# Patient Record
Sex: MALE | Employment: FULL TIME | URBAN - NONMETROPOLITAN AREA
[De-identification: names, ages, dates, MRNs, and addresses within clinical notes are randomized per-mention and may not be internally consistent; named-entity substitution may affect disease eponyms.]

---

## 2024-08-01 ENCOUNTER — OFFICE VISIT (OUTPATIENT)
Dept: PRIMARY CARE CLINIC | Age: 64
End: 2024-08-01

## 2024-08-01 VITALS
BODY MASS INDEX: 31.82 KG/M2 | SYSTOLIC BLOOD PRESSURE: 152 MMHG | WEIGHT: 198 LBS | OXYGEN SATURATION: 97 % | HEIGHT: 66 IN | DIASTOLIC BLOOD PRESSURE: 90 MMHG | HEART RATE: 97 BPM

## 2024-08-01 DIAGNOSIS — E66.9 OBESITY (BMI 30-39.9): ICD-10-CM

## 2024-08-01 DIAGNOSIS — Z00.00 WELL ADULT EXAM: Primary | ICD-10-CM

## 2024-08-01 DIAGNOSIS — R06.83 SNORING: ICD-10-CM

## 2024-08-01 DIAGNOSIS — Z12.11 COLON CANCER SCREENING: ICD-10-CM

## 2024-08-01 DIAGNOSIS — R03.0 ELEVATED BP WITHOUT DIAGNOSIS OF HYPERTENSION: ICD-10-CM

## 2024-08-01 DIAGNOSIS — Z12.5 SCREENING PSA (PROSTATE SPECIFIC ANTIGEN): ICD-10-CM

## 2024-08-01 DIAGNOSIS — M79.672 LEFT FOOT PAIN: ICD-10-CM

## 2024-08-01 DIAGNOSIS — G47.30 SLEEP APNEA, UNSPECIFIED TYPE: ICD-10-CM

## 2024-08-01 RX ORDER — CALCIUM CARBONATE 500(1250)
500 TABLET ORAL DAILY
COMMUNITY

## 2024-08-01 SDOH — ECONOMIC STABILITY: HOUSING INSECURITY
IN THE LAST 12 MONTHS, WAS THERE A TIME WHEN YOU DID NOT HAVE A STEADY PLACE TO SLEEP OR SLEPT IN A SHELTER (INCLUDING NOW)?: NO

## 2024-08-01 SDOH — ECONOMIC STABILITY: FOOD INSECURITY: WITHIN THE PAST 12 MONTHS, THE FOOD YOU BOUGHT JUST DIDN'T LAST AND YOU DIDN'T HAVE MONEY TO GET MORE.: NEVER TRUE

## 2024-08-01 SDOH — ECONOMIC STABILITY: FOOD INSECURITY: WITHIN THE PAST 12 MONTHS, YOU WORRIED THAT YOUR FOOD WOULD RUN OUT BEFORE YOU GOT MONEY TO BUY MORE.: NEVER TRUE

## 2024-08-01 SDOH — ECONOMIC STABILITY: INCOME INSECURITY: HOW HARD IS IT FOR YOU TO PAY FOR THE VERY BASICS LIKE FOOD, HOUSING, MEDICAL CARE, AND HEATING?: NOT HARD AT ALL

## 2024-08-01 ASSESSMENT — PATIENT HEALTH QUESTIONNAIRE - PHQ9
1. LITTLE INTEREST OR PLEASURE IN DOING THINGS: NOT AT ALL
SUM OF ALL RESPONSES TO PHQ QUESTIONS 1-9: 0
2. FEELING DOWN, DEPRESSED OR HOPELESS: NOT AT ALL
SUM OF ALL RESPONSES TO PHQ9 QUESTIONS 1 & 2: 0
SUM OF ALL RESPONSES TO PHQ QUESTIONS 1-9: 0

## 2024-08-31 PROBLEM — Z12.5 SCREENING PSA (PROSTATE SPECIFIC ANTIGEN): Status: RESOLVED | Noted: 2024-08-01 | Resolved: 2024-08-31

## 2024-08-31 PROBLEM — Z12.11 COLON CANCER SCREENING: Status: RESOLVED | Noted: 2024-08-01 | Resolved: 2024-08-31

## 2024-08-31 PROBLEM — Z00.00 WELL ADULT EXAM: Status: RESOLVED | Noted: 2024-08-01 | Resolved: 2024-08-31

## 2024-09-25 ENCOUNTER — HOSPITAL ENCOUNTER (OUTPATIENT)
Dept: SLEEP CENTER | Age: 64
Discharge: HOME | End: 2024-09-25
Payer: MEDICARE

## 2024-09-25 VITALS — HEIGHT: 66 IN | WEIGHT: 198 LBS | BODY MASS INDEX: 31.82 KG/M2

## 2024-09-25 DIAGNOSIS — R06.83 SNORING: ICD-10-CM

## 2024-09-25 DIAGNOSIS — G47.30 SLEEP APNEA, UNSPECIFIED TYPE: ICD-10-CM

## 2024-09-25 DIAGNOSIS — R03.0 ELEVATED BP WITHOUT DIAGNOSIS OF HYPERTENSION: ICD-10-CM

## 2024-09-25 PROCEDURE — 95810 POLYSOM 6/> YRS 4/> PARAM: CPT

## 2024-09-25 ASSESSMENT — SLEEP AND FATIGUE QUESTIONNAIRES
HOW LIKELY ARE YOU TO NOD OFF OR FALL ASLEEP WHILE SITTING AND READING: WOULD NEVER DOZE
HOW LIKELY ARE YOU TO NOD OFF OR FALL ASLEEP WHEN YOU ARE A PASSENGER IN A CAR FOR AN HOUR WITHOUT A BREAK: WOULD NEVER DOZE
DO YOU SNORE: YES
ARE YOU TIRED AFTER SLEEPING: NEVER/ALMOST NEVER
HOW LIKELY ARE YOU TO NOD OFF OR FALL ASLEEP IN A CAR, WHILE STOPPED FOR A FEW MINUTES IN TRAFFIC: WOULD NEVER DOZE
ARE YOU TIRED DURING WAKE TIME: NEVER/ALMOST NEVER
SNORING VOLUME: AS LOUD AS TALKING
NORMAL AMOUNT OF SLEEP PER NIGHT: 7
FOR THE FIRST 30 MINUTES AFTER WAKING, I AM: SLIGHTLY DROWSY
DO YOU HAVE HIGH BLOOD PRESSURE: NO
USUAL AMOUNT OF TIME TO FALL ASLEEP (MIN): 10
HAS ANYONE NOTICED THAT YOU QUIT BREATHING DURING SLEEP: NEVER/ALMOST NEVER
WHAT TIME DO YOU USUALLY WAKE UP: 19800
ESS TOTAL SCORE: 4
HOW LIKELY ARE YOU TO NOD OFF OR FALL ASLEEP WHILE WATCHING TV: WOULD NEVER DOZE
HOW LIKELY ARE YOU TO NOD OFF OR FALL ASLEEP WHILE SITTING AND TALKING TO SOMEONE: WOULD NEVER DOZE
HOW LIKELY ARE YOU TO NOD OFF OR FALL ASLEEP WHILE LYING DOWN TO REST IN THE AFTERNOON WHEN CIRCUMSTANCES PERMIT: MODERATE CHANCE OF DOZING
HOW MANY NAPS DO YOU TAKE PER WEEK: 0
FUNCTION BEST IN: MORNING
I SLEEP WELL: YES
WHAT TIME DO YOU USUALLY GO TO BED: 81000
HAVE YOU EVER NODDED OFF OR FALLEN ASLEEP WHILE DRIVING: NO
HOW LIKELY ARE YOU TO NOD OFF OR FALL ASLEEP WHILE SITTING QUIETLY AFTER LUNCH WITHOUT ALCOHOL: MODERATE CHANCE OF DOZING
HOW LIKELY ARE YOU TO NOD OFF OR FALL ASLEEP WHILE SITTING INACTIVE IN A PUBLIC PLACE: WOULD NEVER DOZE
DOES YOUR SNORING BOTHER OTHERS: YES
HOW OFTEN DO YOU SNORE: ALMOST DAILY
NUMBER OF TIMES YOU WAKE PER NIGHT: 1

## 2024-09-30 LAB — STATUS: NORMAL

## 2024-10-04 ENCOUNTER — HOSPITAL ENCOUNTER (OUTPATIENT)
Dept: GENERAL RADIOLOGY | Age: 64
Discharge: HOME | End: 2024-10-06
Payer: MEDICARE

## 2024-10-04 ENCOUNTER — HOSPITAL ENCOUNTER (OUTPATIENT)
Age: 64
Discharge: HOME | End: 2024-10-06
Payer: MEDICARE

## 2024-10-04 DIAGNOSIS — M79.672 LEFT FOOT PAIN: ICD-10-CM

## 2024-10-04 PROCEDURE — 73630 X-RAY EXAM OF FOOT: CPT

## 2024-10-07 DIAGNOSIS — M79.672 LEFT FOOT PAIN: Primary | ICD-10-CM

## 2024-10-11 LAB
A/G RATIO: 1.8 RATIO (ref 1–2.5)
ALBUMIN: 4.4 G/DL (ref 3.5–5.2)
ALK PHOSPHATASE: 84 U/L (ref 39–118)
ALT SERPL-CCNC: 15 U/L (ref 5–41)
AST SERPL-CCNC: 25 U/L (ref 9–50)
BASOPHILS ABSOLUTE: 0.03 K/UL (ref 0–0.2)
BASOPHILS RELATIVE PERCENT: 0.5 % (ref 0–2)
BILIRUB SERPL-MCNC: 1.6 MG/DL
BUN BLDV-MCNC: 11 MG/DL (ref 8–23)
CALCIUM SERPL-MCNC: 9.4 MG/DL (ref 8.6–10.5)
CHLORIDE BLD-SCNC: 103 MMOL/L (ref 96–107)
CHOLESTEROL, TOTAL: 184 MG/DL (ref 100–199)
CHOLESTEROL/HDL RATIO: 3.8 (ref 2–4.5)
CO2: 25 MMOL/L (ref 18–32)
CREAT SERPL-MCNC: 1.09 MG/DL (ref 0.67–1.3)
EGFR IF NONAFRICAN AMERICAN: 75 ML/MIN/1.73M2
EOSINOPHILS ABSOLUTE: 0.09 K/UL (ref 0–0.8)
EOSINOPHILS RELATIVE PERCENT: 1.5 % (ref 0–5)
ESTIMATED AVERAGE GLUCOSE: 120 MG/DL
FOLATE: 17 NG/ML
GLOBULIN: 2.5 G/DL (ref 1.8–3.8)
GLUCOSE: 123 MG/DL (ref 70–100)
HBA1C MFR BLD: 5.8 %
HCT VFR BLD CALC: 44.2 % (ref 39–52)
HDLC SERPL-MCNC: 49 MG/DL
HEMOGLOBIN: 16.2 G/DL (ref 13–18)
IMMATURE GRANS (ABS): 0.01 K/UL (ref 0–0.06)
IMMATURE GRANULOCYTES %: 0.2 % (ref 0–2)
LDL CHOLESTEROL: 101 MG/DL
LDL/HDL RATIO: 2.1
LYMPHOCYTES ABSOLUTE: 2.49 K/UL (ref 0.9–5.2)
LYMPHOCYTES RELATIVE PERCENT: 41.6 % (ref 20–45)
MCH RBC QN AUTO: 33.8 PG (ref 26–32)
MCHC RBC AUTO-ENTMCNC: 36.7 G/DL (ref 32–35)
MCV RBC AUTO: 92 FL (ref 75–100)
MONOCYTES ABSOLUTE: 0.46 K/UL (ref 0.1–1)
MONOCYTES RELATIVE PERCENT: 7.7 % (ref 0–13)
NEUTROPHILS ABSOLUTE: 2.9 K/UL (ref 1.9–8)
NEUTROPHILS RELATIVE PERCENT: 48.5 % (ref 45–75)
PDW BLD-RTO: 12.3 % (ref 11.2–14.8)
PLATELET # BLD: 223 THOUS/CMM (ref 140–440)
POTASSIUM SERPL-SCNC: 4.6 MMOL/L (ref 3.5–5.4)
PSA, ULTRASENSITIVE: 0.63 NG/ML (ref 0–4)
RBC # BLD: 4.8 MILL/CMM (ref 4.4–6.1)
SODIUM BLD-SCNC: 141 MMOL/L (ref 135–148)
TOTAL PROTEIN: 6.9 G/DL (ref 6–8.3)
TRIGL SERPL-MCNC: 169 MG/DL (ref 20–149)
VITAMIN B-12: 419 PG/ML (ref 232–1245)
VLDLC SERPL CALC-MCNC: 34 MG/DL
WBC # BLD: 6 THDS/CMM (ref 3.6–11)

## 2024-10-31 ENCOUNTER — HOSPITAL ENCOUNTER (OUTPATIENT)
Age: 64
Discharge: HOME | End: 2024-11-02
Attending: INTERNAL MEDICINE
Payer: MEDICARE

## 2024-10-31 ENCOUNTER — HOSPITAL ENCOUNTER (OUTPATIENT)
Age: 64
Discharge: HOME | End: 2024-10-31
Attending: INTERNAL MEDICINE
Payer: MEDICARE

## 2024-10-31 ENCOUNTER — OFFICE VISIT (OUTPATIENT)
Dept: CARDIOLOGY | Age: 64
End: 2024-10-31
Payer: MEDICARE

## 2024-10-31 VITALS
HEART RATE: 79 BPM | BODY MASS INDEX: 31.72 KG/M2 | HEIGHT: 66 IN | DIASTOLIC BLOOD PRESSURE: 89 MMHG | OXYGEN SATURATION: 98 % | WEIGHT: 197.4 LBS | RESPIRATION RATE: 18 BRPM | SYSTOLIC BLOOD PRESSURE: 155 MMHG

## 2024-10-31 VITALS
SYSTOLIC BLOOD PRESSURE: 155 MMHG | BODY MASS INDEX: 31.66 KG/M2 | WEIGHT: 197 LBS | HEIGHT: 66 IN | DIASTOLIC BLOOD PRESSURE: 89 MMHG

## 2024-10-31 DIAGNOSIS — E66.9 OBESITY (BMI 30-39.9): ICD-10-CM

## 2024-10-31 DIAGNOSIS — G47.39 OTHER SLEEP APNEA: ICD-10-CM

## 2024-10-31 DIAGNOSIS — I10 PRIMARY HYPERTENSION: ICD-10-CM

## 2024-10-31 DIAGNOSIS — I10 PRIMARY HYPERTENSION: Primary | ICD-10-CM

## 2024-10-31 DIAGNOSIS — R94.31 ABNORMAL ECG: ICD-10-CM

## 2024-10-31 DIAGNOSIS — I11.9 HYPERTENSIVE HEART DISEASE WITHOUT CHF: ICD-10-CM

## 2024-10-31 LAB — TSH SERPL DL<=0.05 MIU/L-ACNC: 1.58 UIU/ML (ref 0.27–4.2)

## 2024-10-31 PROCEDURE — G8417 CALC BMI ABV UP PARAM F/U: HCPCS | Performed by: INTERNAL MEDICINE

## 2024-10-31 PROCEDURE — 99204 OFFICE O/P NEW MOD 45 MIN: CPT | Performed by: INTERNAL MEDICINE

## 2024-10-31 PROCEDURE — 3017F COLORECTAL CA SCREEN DOC REV: CPT | Performed by: INTERNAL MEDICINE

## 2024-10-31 PROCEDURE — 1036F TOBACCO NON-USER: CPT | Performed by: INTERNAL MEDICINE

## 2024-10-31 PROCEDURE — 93010 ELECTROCARDIOGRAM REPORT: CPT | Performed by: INTERNAL MEDICINE

## 2024-10-31 PROCEDURE — 36415 COLL VENOUS BLD VENIPUNCTURE: CPT

## 2024-10-31 PROCEDURE — G8427 DOCREV CUR MEDS BY ELIG CLIN: HCPCS | Performed by: INTERNAL MEDICINE

## 2024-10-31 PROCEDURE — C8929 TTE W OR WO FOL WCON,DOPPLER: HCPCS

## 2024-10-31 PROCEDURE — 3077F SYST BP >= 140 MM HG: CPT | Performed by: INTERNAL MEDICINE

## 2024-10-31 PROCEDURE — 1123F ACP DISCUSS/DSCN MKR DOCD: CPT | Performed by: INTERNAL MEDICINE

## 2024-10-31 PROCEDURE — 93005 ELECTROCARDIOGRAM TRACING: CPT | Performed by: INTERNAL MEDICINE

## 2024-10-31 PROCEDURE — 3079F DIAST BP 80-89 MM HG: CPT | Performed by: INTERNAL MEDICINE

## 2024-10-31 PROCEDURE — G8484 FLU IMMUNIZE NO ADMIN: HCPCS | Performed by: INTERNAL MEDICINE

## 2024-10-31 PROCEDURE — 99211 OFF/OP EST MAY X REQ PHY/QHP: CPT | Performed by: INTERNAL MEDICINE

## 2024-10-31 PROCEDURE — 84443 ASSAY THYROID STIM HORMONE: CPT

## 2024-10-31 RX ORDER — LOSARTAN POTASSIUM AND HYDROCHLOROTHIAZIDE 12.5; 5 MG/1; MG/1
1 TABLET ORAL DAILY
Qty: 90 TABLET | Refills: 3 | Status: SHIPPED | OUTPATIENT
Start: 2024-10-31

## 2024-10-31 NOTE — PROGRESS NOTES
I, Lanie Collins am scribing for and in the presence of Eric Ha MD, F.A.C.C..    Patient: Hemant Strauss  : 1960  Date of Visit: 2024    REASON FOR VISIT / CONSULTATION: Establish Cardiologist (HX:HTN PT is here to est care today for HTN, started a year ago and go over lab work Denies:CP, sob, lightheaded/dizziness,palp)      History of Present Illness:        Dear Manuel Ordaz MD,     I had the pleasure of seeing Hemant Strauss in my office today. Mr. Strauss is a 64 y.o. male with a history of uncontrolled hypertension.    His mother has hypertension and coronary artery disease both in her late 70-80's.  He denies ever being a smoker, drinker or use of drugs.   No known history of diabetes or dyslipidemia. He just had blood work and only remarkable for borderline elevated triglycerides.     EKG done today in office 10/31/2024: Normal sinus rhythm LBBB    Mr. Strauss is here to establish care today for hypertension. He states he is doing well just notice elevated blood pressure for about a year now. He does snore at night although he was tested for sleep apnea which shows no significant and does not need CPAP. He falls asleep easily and wakes up rested although he does snore. He is not very active, denies any routine activity. His weight is stable. He does have some occasional foot discomfort and that also limits his activity.   Mr. Strauss denies any chest pain now or in the recent past, increased shortness of breath, abdominal pain, bleeding problems, problems with his medications or any other concerns at this time.     PAST MEDICAL HISTORY:        Past Medical History:   Diagnosis Date    HTN (hypertension)        CURRENT ALLERGIES: Patient has no known allergies. REVIEW OF SYSTEMS: 14 systems were reviewed. Pertinent positives and negatives as above, all else negative.     Past Surgical History:   Procedure Laterality Date    HERNIA REPAIR      Social History:  Social History

## 2024-10-31 NOTE — PROGRESS NOTES
Explained policies and procedure of an echocardiogram/Doppler study. Notified Dr. Ha of echo findings. Patient OK to go home. Patient and his wife requested echo be read by Dr. Ha.

## 2024-11-01 ENCOUNTER — TELEPHONE (OUTPATIENT)
Dept: CARDIOLOGY | Age: 64
End: 2024-11-01

## 2024-11-01 LAB
ECHO AO SINUS VALSALVA DIAM: 3.3 CM
ECHO AO SINUS VALSALVA INDEX: 1.66 CM/M2
ECHO AO ST JNCT DIAM: 2.7 CM
ECHO AV CUSP MM: 1.9 CM
ECHO AV MEAN GRADIENT: 2 MMHG
ECHO AV MEAN VELOCITY: 0.6 M/S
ECHO AV PEAK GRADIENT: 3 MMHG
ECHO AV PEAK VELOCITY: 0.9 M/S
ECHO AV VELOCITY RATIO: 0.78
ECHO AV VTI: 18.5 CM
ECHO BSA: 2.04 M2
ECHO LA AREA 2C: 14.7 CM2
ECHO LA AREA 4C: 14.7 CM2
ECHO LA MAJOR AXIS: 4.9 CM
ECHO LA MINOR AXIS: 4.9 CM
ECHO LA VOL BP: 36 ML (ref 18–58)
ECHO LA VOL MOD A2C: 36 ML (ref 18–58)
ECHO LA VOL MOD A4C: 35 ML (ref 18–58)
ECHO LA VOL/BSA BIPLANE: 18 ML/M2 (ref 16–34)
ECHO LA VOLUME INDEX MOD A2C: 18 ML/M2 (ref 16–34)
ECHO LA VOLUME INDEX MOD A4C: 18 ML/M2 (ref 16–34)
ECHO LV E' LATERAL VELOCITY: 7.4 CM/S
ECHO LV EDV A2C: 100 ML
ECHO LV EDV A4C: 104 ML
ECHO LV EDV INDEX A4C: 52 ML/M2
ECHO LV EDV NDEX A2C: 50 ML/M2
ECHO LV EJECTION FRACTION A2C: 55 %
ECHO LV EJECTION FRACTION A4C: 59 %
ECHO LV EJECTION FRACTION BIPLANE: 59 % (ref 55–100)
ECHO LV ESV A2C: 45 ML
ECHO LV ESV A4C: 42 ML
ECHO LV ESV INDEX A2C: 23 ML/M2
ECHO LV ESV INDEX A4C: 21 ML/M2
ECHO LV FRACTIONAL SHORTENING: 28 % (ref 28–44)
ECHO LV INTERNAL DIMENSION DIASTOLE INDEX: 2.16 CM/M2
ECHO LV INTERNAL DIMENSION DIASTOLIC: 4.3 CM (ref 4.2–5.9)
ECHO LV INTERNAL DIMENSION SYSTOLIC INDEX: 1.56 CM/M2
ECHO LV INTERNAL DIMENSION SYSTOLIC: 3.1 CM
ECHO LV IVSD: 1.1 CM (ref 0.6–1)
ECHO LV MASS 2D: 162.9 G (ref 88–224)
ECHO LV MASS INDEX 2D: 81.9 G/M2 (ref 49–115)
ECHO LV POSTERIOR WALL DIASTOLIC: 1.1 CM (ref 0.6–1)
ECHO LV RELATIVE WALL THICKNESS RATIO: 0.51
ECHO LVOT AV VTI INDEX: 0.7
ECHO LVOT MEAN GRADIENT: 1 MMHG
ECHO LVOT PEAK GRADIENT: 2 MMHG
ECHO LVOT PEAK VELOCITY: 0.7 M/S
ECHO LVOT VTI: 12.9 CM
ECHO MV A VELOCITY: 0.65 M/S
ECHO MV E DECELERATION TIME (DT): 218 MS
ECHO MV E VELOCITY: 0.43 M/S
ECHO MV E/A RATIO: 0.66
ECHO MV E/E' LATERAL: 5.81
ECHO PV MAX VELOCITY: 1.2 M/S
ECHO PV PEAK GRADIENT: 6 MMHG

## 2024-11-01 PROCEDURE — 6360000004 HC RX CONTRAST MEDICATION: Performed by: INTERNAL MEDICINE

## 2024-11-01 RX ADMIN — PERFLUTREN 1.5 ML: 6.52 INJECTION, SUSPENSION INTRAVENOUS at 14:50

## 2025-05-15 RX ORDER — SILVER SULFADIAZINE 10 MG/G
CREAM TOPICAL
Qty: 50 G | Refills: 3 | Status: SHIPPED | OUTPATIENT
Start: 2025-05-15

## 2025-05-15 NOTE — PROGRESS NOTES
There were concerns about a burn and silver topical 1% lotion Rx was provided.  Electronically signed by Manuel Ordaz MD on 5/15/2025 at 3:16 PM